# Patient Record
Sex: FEMALE | Race: WHITE | NOT HISPANIC OR LATINO | Employment: FULL TIME | URBAN - METROPOLITAN AREA
[De-identification: names, ages, dates, MRNs, and addresses within clinical notes are randomized per-mention and may not be internally consistent; named-entity substitution may affect disease eponyms.]

---

## 2023-03-22 DIAGNOSIS — I10 HYPERTENSION, UNSPECIFIED TYPE: Primary | ICD-10-CM

## 2023-03-27 RX ORDER — HYDROCHLOROTHIAZIDE 12.5 MG/1
TABLET ORAL
Qty: 180 TABLET | Refills: 1 | Status: SHIPPED | OUTPATIENT
Start: 2023-03-27 | End: 2023-09-08

## 2023-05-23 LAB — THYROTROPIN (MIU/L) IN SER/PLAS BY DETECTION LIMIT <= 0.05 MIU/L: 1.39 MIU/L (ref 0.44–3.98)

## 2023-07-26 DIAGNOSIS — E78.00 PURE HYPERCHOLESTEROLEMIA: Primary | ICD-10-CM

## 2023-07-27 ENCOUNTER — LAB (OUTPATIENT)
Dept: LAB | Facility: LAB | Age: 63
End: 2023-07-27
Payer: MEDICARE

## 2023-07-27 DIAGNOSIS — E78.00 PURE HYPERCHOLESTEROLEMIA: ICD-10-CM

## 2023-07-27 LAB
CHOLESTEROL (MG/DL) IN SER/PLAS: 218 MG/DL (ref 0–199)
CHOLESTEROL IN HDL (MG/DL) IN SER/PLAS: 54.7 MG/DL
CHOLESTEROL/HDL RATIO: 4
LDL: 147 MG/DL (ref 0–99)
TRIGLYCERIDE (MG/DL) IN SER/PLAS: 84 MG/DL (ref 0–149)
VLDL: 17 MG/DL (ref 0–40)

## 2023-07-27 PROCEDURE — 36415 COLL VENOUS BLD VENIPUNCTURE: CPT

## 2023-07-27 PROCEDURE — 80061 LIPID PANEL: CPT

## 2023-07-28 RX ORDER — LEVOTHYROXINE SODIUM 88 UG/1
1 TABLET ORAL DAILY
COMMUNITY
Start: 2015-10-31

## 2023-07-28 RX ORDER — ACETAMINOPHEN 500 MG
1 TABLET ORAL NIGHTLY
COMMUNITY
Start: 2021-05-24 | End: 2023-11-14 | Stop reason: ALTCHOICE

## 2023-09-06 DIAGNOSIS — I10 HYPERTENSION, UNSPECIFIED TYPE: ICD-10-CM

## 2023-09-08 RX ORDER — HYDROCHLOROTHIAZIDE 12.5 MG/1
TABLET ORAL
Qty: 180 TABLET | Refills: 1 | Status: SHIPPED | OUTPATIENT
Start: 2023-09-08 | End: 2023-12-28

## 2023-10-23 ENCOUNTER — APPOINTMENT (OUTPATIENT)
Dept: OPHTHALMOLOGY | Facility: CLINIC | Age: 63
End: 2023-10-23
Payer: MEDICARE

## 2023-10-23 ENCOUNTER — OFFICE VISIT (OUTPATIENT)
Dept: OPHTHALMOLOGY | Facility: CLINIC | Age: 63
End: 2023-10-23
Payer: MEDICARE

## 2023-10-23 DIAGNOSIS — H10.32 ACUTE CONJUNCTIVITIS OF LEFT EYE, UNSPECIFIED ACUTE CONJUNCTIVITIS TYPE: ICD-10-CM

## 2023-10-23 DIAGNOSIS — T15.12XA FOREIGN BODY OF CONJUNCTIVAL SAC OF LEFT EYE, INITIAL ENCOUNTER: Primary | ICD-10-CM

## 2023-10-23 PROCEDURE — 99213 OFFICE O/P EST LOW 20 MIN: CPT | Performed by: OPTOMETRIST

## 2023-10-23 PROCEDURE — 65210 REMOVE FOREIGN BODY FROM EYE: CPT | Mod: LEFT SIDE | Performed by: OPTOMETRIST

## 2023-10-23 RX ORDER — NEOMYCIN SULFATE, POLYMYXIN B SULFATE, AND DEXAMETHASONE 3.5; 10000; 1 MG/G; [USP'U]/G; MG/G
OINTMENT OPHTHALMIC EVERY 8 HOURS SCHEDULED
Qty: 3.5 G | Refills: 0 | Status: SHIPPED | OUTPATIENT
Start: 2023-10-23 | End: 2023-10-26

## 2023-10-23 ASSESSMENT — VISUAL ACUITY
OS_SC+: +2
OS_PH_SC: 20/30
OS_PH_SC+: -1
OD_SC+: -1
OD_PH_SC+: -2
OD_SC: 20/30
METHOD: SNELLEN - LINEAR
OS_SC: 20/50
OD_PH_SC: 20/20

## 2023-10-23 ASSESSMENT — CONF VISUAL FIELD
OS_INFERIOR_NASAL_RESTRICTION: 0
OD_NORMAL: 1
OS_SUPERIOR_NASAL_RESTRICTION: 0
OD_INFERIOR_NASAL_RESTRICTION: 0
OD_SUPERIOR_TEMPORAL_RESTRICTION: 0
OD_INFERIOR_TEMPORAL_RESTRICTION: 0
OS_INFERIOR_TEMPORAL_RESTRICTION: 0
OD_SUPERIOR_NASAL_RESTRICTION: 0
OS_SUPERIOR_TEMPORAL_RESTRICTION: 0
OS_NORMAL: 1

## 2023-10-23 ASSESSMENT — ENCOUNTER SYMPTOMS
RESPIRATORY NEGATIVE: 0
HEMATOLOGIC/LYMPHATIC NEGATIVE: 0
CARDIOVASCULAR NEGATIVE: 0
ENDOCRINE NEGATIVE: 0
CONSTITUTIONAL NEGATIVE: 0
ALLERGIC/IMMUNOLOGIC NEGATIVE: 0
MUSCULOSKELETAL NEGATIVE: 0
GASTROINTESTINAL NEGATIVE: 0
NEUROLOGICAL NEGATIVE: 0
PSYCHIATRIC NEGATIVE: 0
EYES NEGATIVE: 1

## 2023-10-23 ASSESSMENT — EXTERNAL EXAM - LEFT EYE: OS_EXAM: NORMAL

## 2023-10-23 ASSESSMENT — SLIT LAMP EXAM - LIDS
COMMENTS: NORMAL
COMMENTS: NORMAL

## 2023-10-23 ASSESSMENT — TONOMETRY
IOP_METHOD: TONOPEN (SQUEEZING)
OS_IOP_MMHG: 21
OD_IOP_MMHG: 18

## 2023-10-23 ASSESSMENT — EXTERNAL EXAM - RIGHT EYE: OD_EXAM: NORMAL

## 2023-10-23 NOTE — PROGRESS NOTES
Ms. Savage is a 64 yo female without significant ocular history presenting for left eye pain. Patient was outside when a liam of wind blew something into her eye.    Plaque like deposit on lateral aspect of left conj at 3 oc clock close to the limbus. This was removed with a cotton tip applicator after anesthetic was applied. Mild corneal staining as well at 3 oclock. Everted eyelid and no foreign body found anywhere.    Start neopolydex julia use TID x 3 days then can stop.     Pt is scheduled already to RTC 2 week for routine exam.

## 2023-11-07 PROBLEM — H52.12 MYOPIA OF LEFT EYE WITH ASTIGMATISM AND PRESBYOPIA: Status: ACTIVE | Noted: 2023-11-07

## 2023-11-07 PROBLEM — H52.203 ASTIGMATISM OF BOTH EYES: Status: ACTIVE | Noted: 2023-11-07

## 2023-11-07 PROBLEM — H52.201 HYPEROPIA OF RIGHT EYE WITH ASTIGMATISM AND PRESBYOPIA: Status: ACTIVE | Noted: 2023-11-07

## 2023-11-07 PROBLEM — H52.202 MYOPIA OF LEFT EYE WITH ASTIGMATISM AND PRESBYOPIA: Status: ACTIVE | Noted: 2023-11-07

## 2023-11-07 PROBLEM — R73.01 IMPAIRED FASTING GLUCOSE: Status: ACTIVE | Noted: 2023-11-07

## 2023-11-07 PROBLEM — H00.016 HORDEOLUM EXTERNUM OF LEFT EYE: Status: ACTIVE | Noted: 2023-11-07

## 2023-11-07 PROBLEM — H43.811 PVD (POSTERIOR VITREOUS DETACHMENT), RIGHT EYE: Status: ACTIVE | Noted: 2023-11-07

## 2023-11-07 PROBLEM — H52.01 HYPEROPIA OF RIGHT EYE WITH ASTIGMATISM AND PRESBYOPIA: Status: ACTIVE | Noted: 2023-11-07

## 2023-11-07 PROBLEM — E78.00 HYPERCHOLESTEROLEMIA: Status: ACTIVE | Noted: 2023-11-07

## 2023-11-07 PROBLEM — E03.8 OTHER SPECIFIED HYPOTHYROIDISM: Status: ACTIVE | Noted: 2023-11-07

## 2023-11-07 PROBLEM — E06.3 AUTOIMMUNE THYROIDITIS: Status: ACTIVE | Noted: 2023-11-07

## 2023-11-07 PROBLEM — M25.512 BILATERAL SHOULDER PAIN: Status: ACTIVE | Noted: 2023-11-07

## 2023-11-07 PROBLEM — I10 HYPERTENSION: Status: ACTIVE | Noted: 2023-11-07

## 2023-11-07 PROBLEM — N60.99 ATYPICAL HYPERPLASIA OF BREAST: Status: ACTIVE | Noted: 2023-11-07

## 2023-11-07 PROBLEM — H52.4 MYOPIA OF LEFT EYE WITH ASTIGMATISM AND PRESBYOPIA: Status: ACTIVE | Noted: 2023-11-07

## 2023-11-07 PROBLEM — M25.511 BILATERAL SHOULDER PAIN: Status: ACTIVE | Noted: 2023-11-07

## 2023-11-07 PROBLEM — E03.9 HYPOTHYROIDISM: Status: ACTIVE | Noted: 2023-11-07

## 2023-11-07 PROBLEM — L30.4 ECZEMA INTERTRIGO: Status: ACTIVE | Noted: 2023-11-07

## 2023-11-07 PROBLEM — E04.2 NONTOXIC MULTINODULAR GOITER: Status: ACTIVE | Noted: 2023-11-07

## 2023-11-07 PROBLEM — H52.4 HYPEROPIA OF RIGHT EYE WITH ASTIGMATISM AND PRESBYOPIA: Status: ACTIVE | Noted: 2023-11-07

## 2023-11-07 PROBLEM — E55.9 VITAMIN D DEFICIENCY: Status: ACTIVE | Noted: 2023-11-07

## 2023-11-07 RX ORDER — BIOTIN 10 MG
TABLET ORAL
COMMUNITY
End: 2023-11-14 | Stop reason: ALTCHOICE

## 2023-11-07 RX ORDER — KETOCONAZOLE 20 MG/G
CREAM TOPICAL
COMMUNITY
Start: 2023-07-27 | End: 2023-11-14 | Stop reason: ALTCHOICE

## 2023-11-07 RX ORDER — ERGOCALCIFEROL 1.25 MG/1
CAPSULE ORAL
COMMUNITY
End: 2023-11-14 | Stop reason: ALTCHOICE

## 2023-11-07 RX ORDER — NAFTIFINE HYDROCHLORIDE 10 MG/G
GEL TOPICAL
COMMUNITY
Start: 2007-10-19 | End: 2023-11-14 | Stop reason: ALTCHOICE

## 2023-11-07 RX ORDER — CIDER VINEGAR 300 MG
TABLET ORAL
COMMUNITY
End: 2023-11-14 | Stop reason: ALTCHOICE

## 2023-11-07 RX ORDER — IBUPROFEN 200 MG
TABLET ORAL
COMMUNITY

## 2023-11-07 RX ORDER — DESONIDE 0.5 MG/ML
LOTION TOPICAL
COMMUNITY
Start: 2007-11-30 | End: 2023-11-14 | Stop reason: ALTCHOICE

## 2023-11-07 RX ORDER — CHOLECALCIFEROL (VITAMIN D3) 50 MCG
1 TABLET ORAL DAILY
COMMUNITY
Start: 2022-12-23 | End: 2023-11-14 | Stop reason: ALTCHOICE

## 2023-11-07 RX ORDER — DICLOFENAC SODIUM 10 MG/G
GEL TOPICAL
COMMUNITY
Start: 2015-08-31 | End: 2023-11-14 | Stop reason: ALTCHOICE

## 2023-11-07 RX ORDER — CLINDAMYCIN PHOSPHATE 10 UG/ML
LOTION TOPICAL
COMMUNITY
Start: 2007-11-30 | End: 2023-11-14 | Stop reason: ALTCHOICE

## 2023-11-08 ENCOUNTER — OFFICE VISIT (OUTPATIENT)
Dept: OPHTHALMOLOGY | Facility: CLINIC | Age: 63
End: 2023-11-08
Payer: MEDICARE

## 2023-11-08 DIAGNOSIS — H52.201 HYPEROPIA OF RIGHT EYE WITH ASTIGMATISM AND PRESBYOPIA: ICD-10-CM

## 2023-11-08 DIAGNOSIS — H52.4 HYPEROPIA OF RIGHT EYE WITH ASTIGMATISM AND PRESBYOPIA: ICD-10-CM

## 2023-11-08 DIAGNOSIS — H25.13 AGE-RELATED NUCLEAR CATARACT OF BOTH EYES: Primary | ICD-10-CM

## 2023-11-08 DIAGNOSIS — H52.01 HYPEROPIA OF RIGHT EYE WITH ASTIGMATISM AND PRESBYOPIA: ICD-10-CM

## 2023-11-08 PROCEDURE — 92015 DETERMINE REFRACTIVE STATE: CPT | Performed by: STUDENT IN AN ORGANIZED HEALTH CARE EDUCATION/TRAINING PROGRAM

## 2023-11-08 PROCEDURE — 92014 COMPRE OPH EXAM EST PT 1/>: CPT | Performed by: STUDENT IN AN ORGANIZED HEALTH CARE EDUCATION/TRAINING PROGRAM

## 2023-11-08 ASSESSMENT — TONOMETRY
IOP_METHOD: GOLDMANN APPLANATION
OS_IOP_MMHG: 16
OD_IOP_MMHG: 16

## 2023-11-08 ASSESSMENT — REFRACTION_MANIFEST
OD_CYLINDER: -2.25
OD_ADD: +2.50
OS_AXIS: 073
OS_SPHERE: +0.25
OS_ADD: +2.50
OS_CYLINDER: -2.75
OD_AXIS: 097
OD_SPHERE: +0.50

## 2023-11-08 ASSESSMENT — ENCOUNTER SYMPTOMS
CARDIOVASCULAR NEGATIVE: 0
NEUROLOGICAL NEGATIVE: 0
ALLERGIC/IMMUNOLOGIC NEGATIVE: 0
HEMATOLOGIC/LYMPHATIC NEGATIVE: 0
GASTROINTESTINAL NEGATIVE: 0
RESPIRATORY NEGATIVE: 0
ENDOCRINE NEGATIVE: 0
CONSTITUTIONAL NEGATIVE: 0
EYES NEGATIVE: 0
PSYCHIATRIC NEGATIVE: 0
MUSCULOSKELETAL NEGATIVE: 0

## 2023-11-08 ASSESSMENT — CONF VISUAL FIELD
OD_INFERIOR_TEMPORAL_RESTRICTION: 0
OS_INFERIOR_TEMPORAL_RESTRICTION: 0
OS_NORMAL: 1
OS_SUPERIOR_NASAL_RESTRICTION: 0
METHOD: COUNTING FINGERS
OD_INFERIOR_NASAL_RESTRICTION: 0
OD_SUPERIOR_TEMPORAL_RESTRICTION: 0
OS_INFERIOR_NASAL_RESTRICTION: 0
OD_NORMAL: 1
OD_SUPERIOR_NASAL_RESTRICTION: 0
OS_SUPERIOR_TEMPORAL_RESTRICTION: 0

## 2023-11-08 ASSESSMENT — REFRACTION_WEARINGRX
OS_SPHERE: +0.25
OS_CYLINDER: -2.50
OD_ADD: +2.50
OS_ADD: +2.50
OS_AXIS: 070
OD_CYLINDER: -2.25
OD_AXIS: 095
OD_SPHERE: +0.25

## 2023-11-08 ASSESSMENT — SLIT LAMP EXAM - LIDS
COMMENTS: NORMAL
COMMENTS: NORMAL

## 2023-11-08 ASSESSMENT — EXTERNAL EXAM - RIGHT EYE: OD_EXAM: NORMAL

## 2023-11-08 ASSESSMENT — VISUAL ACUITY
CORRECTION_TYPE: GLASSES
OD_CC: 20/25
OS_CC+: -2
OS_CC: 20/30
METHOD: SNELLEN - LINEAR

## 2023-11-08 ASSESSMENT — CUP TO DISC RATIO
OD_RATIO: .30
OS_RATIO: .30

## 2023-11-08 ASSESSMENT — EXTERNAL EXAM - LEFT EYE: OS_EXAM: NORMAL

## 2023-11-08 NOTE — PROGRESS NOTES
Assessment/Plan   Diagnoses and all orders for this visit:  Age-related nuclear cataract of both eyes  -mild non visually significant. Pt ed.   Hyperopia of right eye with astigmatism and presbyopia  -New spec rx released today per patient request. Ocular health wnl for age OU. Monitor 1 year or sooner prn. Refraction billed today. Small changes to RX with good vision 20/20 right eye (OD)+left eye (OS).     RTC 1 year for annual with JAMAL and ALLAN

## 2023-11-12 NOTE — PROGRESS NOTES
Subjective   Reason for Visit: Jailyn Savage is an 63 y.o. female here for a Medicare Wellness visit.               HPI  The patient reports a history of intermittent episodes of a throbbing discomfort in the popliteal fossae over the past few months.  The patient reports that when the episodes occur they occur most often at the end of the day.  She reports no other associated symptoms.    The patient reports a history of intermittent momentary episodes of discomfort in the right upper chest region times a few months.  She reports that the episodes do not occur with exercise but occur only at rest.  She reports that the episodes may occur after oral intake.  She reports no radiation of discomfort and no other associated symptoms.    The patient reports a history of intermittent episodes of a pressure aching pain in the right wrist recently.  She reports that the episodes can be precipitated by yoga and by lifting weights.  She reports no radiation of discomfort.  She reports no other associated symptoms.    The patient reports a history of intermittent episodes of tightness in both hips recently.  She reports that the episodes can be precipitated by inactivity.  No other associated symptoms.    Over the past year, the patient reports no episodes of discomfort in the left upper chest region.    Over the past year, the patient reports no morning episodes of nausea.    Over the past year, the patient reports that she has had no difficulty falling back asleep.    Since beginning use of vitamin B complex approximately 1 year ago.  The patient reports a decrease in the frequency and intensity of the chronic intermittent episodes of cramping pain in either thigh or either calf region.    Over the past 6 months, the patient reports that she has experienced no episodes of numbness over the proximal and lateral surfaces of the right thigh.  No other new complaints.  Patient Care Team:  Naveen Roth MD as PCP - General  (Internal Medicine)  Naveen Roth MD     Review of Systems  All systems have been reviewed and are normal except as previously noted  Objective   Vitals:  There were no vitals taken for this visit.      Physical Exam  Head-palpation revealed no tenderness over the maxillary or frontal sinuses  Eyes-extraocular movements intact; pupils equal and reactive to light ;fundi not well-visualized secondary to the presence of cataracts  Ears-palpation ofpinnas and tragusesrevealed no tenderness. External auditory canals not erythematous or swollen. TMs clear.      Nose-turbinates not erythematous or swollen ;no septal deviation noted  Mouth-posterior pharynx is not erythematous or swollen. Tonsillar pillars appeared normal no exudates  Neck no lymphadenopathy. Thyroid gland not enlarged. , No bruits  Breasts-no asymmetry or nipple discharge noted.  Palpation revealed no tenderness or masses, no axillary lymphadenopathy noted.  Lungs-clear to auscultation bilaterally  Cardiac-rate normal rhythm regular no murmurs no JVD  Abdomen-soft nondistended normal active bowel sounds. Palpation revealed no tenderness or masses  Pulses 2+ bilaterally  Extremities-no peripheral edema  Musculoskeletal  Chest-no erythema or swelling, Full range of motion in all directions of motion with no pain. Palpation revealed no tenderness or increase in warmth  Right wrist-no erythema or swelling.  Full range of motion with pain noted on extension.  Full range of motion with no pain noted in all other directions of motion.  Palpation revealed no tenderness or increase in warmth  Hips-no erythema or swelling.  Windshield wiper test negative.  Full range of motion in all directions of motion with no pain.  Palpation revealed no tenderness or increase in warmth  Knees-no erythema or swelling.  Full range of motion in all directions of motion with no pain.  Palpation revealed crepitus but no tenderness or increase in warmth.  Negative Lachemann's test,  negative Saira's test, negative patellar apprehension test.  No pain or laxity of the collateral ligaments noted.    Neurologic  Mental status-alert and oriented x3   Cranial nerves-2 through 12 grossly intact, no visual field abnormalities  Motor-no pronator drift noted, strength-5/5 in all muscle groups tested, , no tremor noted.  No bradykinesia noted.  No rigidity noted.  Negative pull test  Sensory-Light touch sensation fully intact  Pinprick sensation fully intact  Vibratory sensation fully intact  Cerebellar-no truncal ataxia, good coordination finger-nose testing,, good coordination heel-to-shin testing, normal rapid alternating movements  Romberg negative, no abnormality in tandem gait  Reflexes-2+/4 bilaterally  Assessment/Plan   Problem List Items Addressed This Visit    None         Assessment  Intermittent momentary episodes of discomfort in the right upper chest region-May be secondary to neuromuscular chest discomfort, gastroesophageal reflux/spasm-less likely  Hypertension-blood pressure at goal  COVID-19 May 2022  Status post bilateral mammoplasty November 27, 2017  Colonic polyps.  Diverticulosis.  Dysfunctional uterine bleeding.  Intermittent episodes of a pressure aching pain right wrist-May be secondary to tendinitis, osteoarthritis-less likely.  Intermittent episodes of tightness in both hip regions-May be secondary to muscle strain, bilateral hip greater trochanteric bursitis intermittent episodes of throbbing pain in the popliteal fossae-May be secondary to osteoarthritis  Chondromalacia patella right knee.  Vitamin D deficiency.  Impaired fasting glucose  Hypercholesterolemia.  Hypothyroidism.  Chronic intermittent episodes of cramping pain in either thigh or either calf-likely secondary to idiopathic nocturnal leg cramps.  Foreign body left eye-status post removal October 23, 2023  Bilateral cataracts  Chronic occasional episodes of numbness located proximal and lateral surface of the  right thigh-unsure of etiology. May be secondary to right femoral cutaneous neuropathy  Plan  Obtain EKG today  Obtain CBC differential, CMP, fasting lipid profile, TSH, vitamin D level, vitamin B12 level, hemoglobin A1c, urinalysis today.  Begin Advil 400-600 mg p.o. every 6 hours as needed pain.  Continue current dosages of hydrochlorothiazide and levothyroxine for the time being  Patient should return for annual Medicare wellness visit in 1 year

## 2023-11-14 ENCOUNTER — OFFICE VISIT (OUTPATIENT)
Dept: PRIMARY CARE | Facility: CLINIC | Age: 63
End: 2023-11-14
Payer: MEDICARE

## 2023-11-14 VITALS
HEART RATE: 62 BPM | BODY MASS INDEX: 29.41 KG/M2 | WEIGHT: 166 LBS | DIASTOLIC BLOOD PRESSURE: 74 MMHG | SYSTOLIC BLOOD PRESSURE: 100 MMHG | HEIGHT: 63 IN

## 2023-11-14 DIAGNOSIS — Z00.00 ROUTINE GENERAL MEDICAL EXAMINATION AT A HEALTH CARE FACILITY: ICD-10-CM

## 2023-11-14 DIAGNOSIS — I10 PRIMARY HYPERTENSION: Primary | ICD-10-CM

## 2023-11-14 DIAGNOSIS — R73.03 PREDIABETES: ICD-10-CM

## 2023-11-14 DIAGNOSIS — E78.00 HYPERCHOLESTEROLEMIA: ICD-10-CM

## 2023-11-14 LAB
ALBUMIN SERPL BCP-MCNC: 4.4 G/DL (ref 3.4–5)
ALP SERPL-CCNC: 59 U/L (ref 33–136)
ALT SERPL W P-5'-P-CCNC: 14 U/L (ref 7–45)
ANION GAP SERPL CALC-SCNC: 13 MMOL/L (ref 10–20)
AST SERPL W P-5'-P-CCNC: 20 U/L (ref 9–39)
BASOPHILS # BLD AUTO: 0.05 X10*3/UL (ref 0–0.1)
BASOPHILS NFR BLD AUTO: 1.2 %
BILIRUB SERPL-MCNC: 0.4 MG/DL (ref 0–1.2)
BUN SERPL-MCNC: 28 MG/DL (ref 6–23)
CALCIUM SERPL-MCNC: 9.5 MG/DL (ref 8.6–10.6)
CHLORIDE SERPL-SCNC: 101 MMOL/L (ref 98–107)
CHOLEST SERPL-MCNC: 221 MG/DL (ref 0–199)
CHOLESTEROL/HDL RATIO: 4.4
CO2 SERPL-SCNC: 31 MMOL/L (ref 21–32)
CREAT SERPL-MCNC: 0.76 MG/DL (ref 0.5–1.05)
CRP SERPL HS-MCNC: 0.7 MG/L
EOSINOPHIL # BLD AUTO: 0.03 X10*3/UL (ref 0–0.7)
EOSINOPHIL NFR BLD AUTO: 0.7 %
ERYTHROCYTE [DISTWIDTH] IN BLOOD BY AUTOMATED COUNT: 12.1 % (ref 11.5–14.5)
EST. AVERAGE GLUCOSE BLD GHB EST-MCNC: 120 MG/DL
GFR SERPL CREATININE-BSD FRML MDRD: 87 ML/MIN/1.73M*2
GLUCOSE SERPL-MCNC: 86 MG/DL (ref 74–99)
HBA1C MFR BLD: 5.8 %
HCT VFR BLD AUTO: 39.8 % (ref 36–46)
HCV AB SER QL: NONREACTIVE
HDLC SERPL-MCNC: 50.3 MG/DL
HGB BLD-MCNC: 12.9 G/DL (ref 12–16)
IMM GRANULOCYTES # BLD AUTO: 0 X10*3/UL (ref 0–0.7)
IMM GRANULOCYTES NFR BLD AUTO: 0 % (ref 0–0.9)
LDLC SERPL CALC-MCNC: 154 MG/DL
LYMPHOCYTES # BLD AUTO: 1.63 X10*3/UL (ref 1.2–4.8)
LYMPHOCYTES NFR BLD AUTO: 38.2 %
MCH RBC QN AUTO: 30.3 PG (ref 26–34)
MCHC RBC AUTO-ENTMCNC: 32.4 G/DL (ref 32–36)
MCV RBC AUTO: 93 FL (ref 80–100)
MONOCYTES # BLD AUTO: 0.4 X10*3/UL (ref 0.1–1)
MONOCYTES NFR BLD AUTO: 9.4 %
NEUTROPHILS # BLD AUTO: 2.16 X10*3/UL (ref 1.2–7.7)
NEUTROPHILS NFR BLD AUTO: 50.5 %
NON HDL CHOLESTEROL: 171 MG/DL (ref 0–149)
NRBC BLD-RTO: 0 /100 WBCS (ref 0–0)
PLATELET # BLD AUTO: 259 X10*3/UL (ref 150–450)
POTASSIUM SERPL-SCNC: 4 MMOL/L (ref 3.5–5.3)
PROT SERPL-MCNC: 7.1 G/DL (ref 6.4–8.2)
RBC # BLD AUTO: 4.26 X10*6/UL (ref 4–5.2)
SODIUM SERPL-SCNC: 141 MMOL/L (ref 136–145)
TRIGL SERPL-MCNC: 86 MG/DL (ref 0–149)
TSH SERPL-ACNC: 0.89 MIU/L (ref 0.44–3.98)
VLDL: 17 MG/DL (ref 0–40)
WBC # BLD AUTO: 4.3 X10*3/UL (ref 4.4–11.3)

## 2023-11-14 PROCEDURE — 36415 COLL VENOUS BLD VENIPUNCTURE: CPT

## 2023-11-14 PROCEDURE — 1160F RVW MEDS BY RX/DR IN RCRD: CPT | Performed by: INTERNAL MEDICINE

## 2023-11-14 PROCEDURE — 80053 COMPREHEN METABOLIC PANEL: CPT

## 2023-11-14 PROCEDURE — 84443 ASSAY THYROID STIM HORMONE: CPT

## 2023-11-14 PROCEDURE — 1159F MED LIST DOCD IN RCRD: CPT | Performed by: INTERNAL MEDICINE

## 2023-11-14 PROCEDURE — 87086 URINE CULTURE/COLONY COUNT: CPT

## 2023-11-14 PROCEDURE — 85025 COMPLETE CBC W/AUTO DIFF WBC: CPT

## 2023-11-14 PROCEDURE — 3078F DIAST BP <80 MM HG: CPT | Performed by: INTERNAL MEDICINE

## 2023-11-14 PROCEDURE — G0439 PPPS, SUBSEQ VISIT: HCPCS | Performed by: INTERNAL MEDICINE

## 2023-11-14 PROCEDURE — 93000 ELECTROCARDIOGRAM COMPLETE: CPT | Performed by: INTERNAL MEDICINE

## 2023-11-14 PROCEDURE — 99213 OFFICE O/P EST LOW 20 MIN: CPT | Performed by: INTERNAL MEDICINE

## 2023-11-14 PROCEDURE — 86803 HEPATITIS C AB TEST: CPT

## 2023-11-14 PROCEDURE — 86141 C-REACTIVE PROTEIN HS: CPT

## 2023-11-14 PROCEDURE — 1170F FXNL STATUS ASSESSED: CPT | Performed by: INTERNAL MEDICINE

## 2023-11-14 PROCEDURE — 83036 HEMOGLOBIN GLYCOSYLATED A1C: CPT

## 2023-11-14 PROCEDURE — 3074F SYST BP LT 130 MM HG: CPT | Performed by: INTERNAL MEDICINE

## 2023-11-14 PROCEDURE — 80061 LIPID PANEL: CPT

## 2023-11-14 RX ORDER — MULTIVITAMIN/IRON/FOLIC ACID 18MG-0.4MG
1 TABLET ORAL DAILY
COMMUNITY

## 2023-11-14 ASSESSMENT — ACTIVITIES OF DAILY LIVING (ADL)
MANAGING_FINANCES: INDEPENDENT
BATHING: INDEPENDENT
TAKING_MEDICATION: INDEPENDENT
GROCERY_SHOPPING: INDEPENDENT
DRESSING: INDEPENDENT
DOING_HOUSEWORK: INDEPENDENT

## 2023-11-14 ASSESSMENT — ENCOUNTER SYMPTOMS
DEPRESSION: 0
OCCASIONAL FEELINGS OF UNSTEADINESS: 0
LOSS OF SENSATION IN FEET: 0

## 2023-11-16 LAB — BACTERIA UR CULT: NO GROWTH

## 2023-12-28 DIAGNOSIS — I10 HYPERTENSION, UNSPECIFIED TYPE: ICD-10-CM

## 2023-12-28 RX ORDER — HYDROCHLOROTHIAZIDE 12.5 MG/1
TABLET ORAL
Qty: 180 TABLET | Refills: 1 | Status: SHIPPED | OUTPATIENT
Start: 2023-12-28

## 2024-06-25 ENCOUNTER — APPOINTMENT (OUTPATIENT)
Dept: ENDOCRINOLOGY | Facility: CLINIC | Age: 64
End: 2024-06-25
Payer: MEDICARE

## 2024-06-25 VITALS
BODY MASS INDEX: 29.62 KG/M2 | HEART RATE: 76 BPM | HEIGHT: 63 IN | RESPIRATION RATE: 16 BRPM | SYSTOLIC BLOOD PRESSURE: 110 MMHG | DIASTOLIC BLOOD PRESSURE: 64 MMHG | WEIGHT: 167.2 LBS

## 2024-06-25 DIAGNOSIS — R73.03 PRE-DIABETES: Primary | ICD-10-CM

## 2024-06-25 DIAGNOSIS — E03.9 HYPOTHYROIDISM, UNSPECIFIED TYPE: ICD-10-CM

## 2024-06-25 PROCEDURE — 1159F MED LIST DOCD IN RCRD: CPT | Performed by: INTERNAL MEDICINE

## 2024-06-25 PROCEDURE — 1160F RVW MEDS BY RX/DR IN RCRD: CPT | Performed by: INTERNAL MEDICINE

## 2024-06-25 PROCEDURE — 1036F TOBACCO NON-USER: CPT | Performed by: INTERNAL MEDICINE

## 2024-06-25 PROCEDURE — 99213 OFFICE O/P EST LOW 20 MIN: CPT | Performed by: INTERNAL MEDICINE

## 2024-06-25 PROCEDURE — 3074F SYST BP LT 130 MM HG: CPT | Performed by: INTERNAL MEDICINE

## 2024-06-25 PROCEDURE — 3078F DIAST BP <80 MM HG: CPT | Performed by: INTERNAL MEDICINE

## 2024-06-25 ASSESSMENT — ENCOUNTER SYMPTOMS
NAUSEA: 0
FATIGUE: 0
HEADACHES: 0
DIARRHEA: 0
VOMITING: 0
CHILLS: 0
COUGH: 0
FEVER: 0
SHORTNESS OF BREATH: 0
PALPITATIONS: 0

## 2024-06-25 NOTE — PROGRESS NOTES
Endocrinology: Follow up visit  Subjective   Patient ID: Jailyn Savage is a 64 y.o. female who presents for Hypothyroidism.    PCP: Naveen Roth MD    HPI  Since last visit doing well.  Still travelling a great deal.  A1c still in predm range: exercises often but knows could do better with eating.   Taking t4 as directed    Review of Systems   Constitutional:  Negative for chills, fatigue and fever.   Respiratory:  Negative for cough and shortness of breath.    Cardiovascular:  Negative for chest pain and palpitations.   Gastrointestinal:  Negative for diarrhea, nausea and vomiting.   Neurological:  Negative for headaches.       Patient Active Problem List   Diagnosis    Astigmatism of both eyes    Atypical hyperplasia of breast    Autoimmune thyroiditis    Bilateral shoulder pain    Chondromalacia of patella    Eczema intertrigo    Enthesopathy    Hyperopia of right eye with astigmatism and presbyopia    Hypertension    Hypothyroidism    Other specified hypothyroidism    Impaired fasting glucose    Hypercholesterolemia    Nontoxic multinodular goiter    PVD (posterior vitreous detachment), right eye    Vitamin D deficiency    Age-related nuclear cataract of both eyes        Home Meds:  Current Outpatient Medications   Medication Instructions    b complex 0.4 mg tablet 1 tablet, oral, Daily    hydroCHLOROthiazide (HYDRODiuril) 12.5 mg tablet TAKE 2 TABLETS BY MOUTH EVERY DAY    ibuprofen 200 mg tablet     levothyroxine (Synthroid, Levoxyl) 88 mcg tablet 1 tablet, oral, Daily    multivitamin capsule 1 capsule, oral, Daily RT        No Known Allergies     Objective   Vitals:    06/25/24 1326   BP: 110/64   Pulse: 76   Resp: 16      Vitals:    06/25/24 1326   Weight: 75.8 kg (167 lb 3.2 oz)      Body mass index is 29.62 kg/m².   Physical Exam  Constitutional:       Appearance: Normal appearance. She is overweight.   HENT:      Head: Normocephalic and atraumatic.   Neck:      Thyroid: No thyroid mass, thyromegaly or  "thyroid tenderness.   Cardiovascular:      Rate and Rhythm: Normal rate and regular rhythm.      Heart sounds: No murmur heard.     No gallop.   Pulmonary:      Effort: Pulmonary effort is normal.      Breath sounds: Normal breath sounds.   Abdominal:      Palpations: Abdomen is soft.      Comments: benign   Neurological:      General: No focal deficit present.      Mental Status: She is alert and oriented to person, place, and time.      Deep Tendon Reflexes: Reflexes are normal and symmetric.   Psychiatric:         Behavior: Behavior is cooperative.         Labs:  Lab Results   Component Value Date    HGBA1C 5.8 (H) 11/14/2023    TSH 0.89 11/14/2023    FREET4 1.60 (H) 05/24/2021      No results found for: \"PR1\", \"THYROIDPAB\", \"TSI\"     Assessment/Plan   Problem List Items Addressed This Visit       Hypothyroidism    Relevant Orders    TSH with reflex to Free T4 if abnormal     Other Visit Diagnoses       Pre-diabetes    -  Primary    Relevant Orders    Hemoglobin A1C          Blood work when able  Work on lowering carbs  Follow up in one year    Electronically signed by:  Latricia Escobar MD 06/25/24 1:54 PM              "

## 2024-08-14 ENCOUNTER — LAB (OUTPATIENT)
Dept: LAB | Facility: LAB | Age: 64
End: 2024-08-14
Payer: MEDICARE

## 2024-08-14 DIAGNOSIS — E03.9 HYPOTHYROIDISM, UNSPECIFIED TYPE: ICD-10-CM

## 2024-08-14 DIAGNOSIS — E03.9 HYPOTHYROIDISM, UNSPECIFIED: ICD-10-CM

## 2024-08-14 DIAGNOSIS — R73.03 PRE-DIABETES: ICD-10-CM

## 2024-08-14 LAB
EST. AVERAGE GLUCOSE BLD GHB EST-MCNC: 120 MG/DL
HBA1C MFR BLD: 5.8 %
TSH SERPL-ACNC: 1.28 MIU/L (ref 0.44–3.98)

## 2024-08-14 RX ORDER — LEVOTHYROXINE SODIUM 88 UG/1
88 TABLET ORAL DAILY
Qty: 90 TABLET | Refills: 1 | Status: SHIPPED | OUTPATIENT
Start: 2024-08-14

## 2024-08-16 DIAGNOSIS — I10 HYPERTENSION, UNSPECIFIED TYPE: ICD-10-CM

## 2024-08-16 RX ORDER — HYDROCHLOROTHIAZIDE 12.5 MG/1
TABLET ORAL
Qty: 180 TABLET | Refills: 1 | Status: SHIPPED | OUTPATIENT
Start: 2024-08-16

## 2025-02-11 DIAGNOSIS — E03.9 HYPOTHYROIDISM, UNSPECIFIED: ICD-10-CM

## 2025-02-11 RX ORDER — LEVOTHYROXINE SODIUM 88 UG/1
88 TABLET ORAL DAILY
Qty: 90 TABLET | Refills: 1 | Status: SHIPPED | OUTPATIENT
Start: 2025-02-11

## 2025-02-12 DIAGNOSIS — I10 HYPERTENSION, UNSPECIFIED TYPE: ICD-10-CM

## 2025-02-12 RX ORDER — HYDROCHLOROTHIAZIDE 12.5 MG/1
TABLET ORAL
Qty: 180 TABLET | Refills: 1 | Status: SHIPPED | OUTPATIENT
Start: 2025-02-12

## 2025-05-08 ENCOUNTER — OFFICE VISIT (OUTPATIENT)
Dept: URGENT CARE | Age: 65
End: 2025-05-08
Payer: MEDICARE

## 2025-05-08 VITALS
RESPIRATION RATE: 16 BRPM | HEART RATE: 59 BPM | TEMPERATURE: 98.7 F | DIASTOLIC BLOOD PRESSURE: 77 MMHG | OXYGEN SATURATION: 99 % | SYSTOLIC BLOOD PRESSURE: 122 MMHG

## 2025-05-08 DIAGNOSIS — R05.1 ACUTE COUGH: Primary | ICD-10-CM

## 2025-05-08 RX ORDER — CETIRIZINE HYDROCHLORIDE 10 MG/1
10 TABLET ORAL DAILY
Qty: 30 TABLET | Refills: 0 | Status: SHIPPED | OUTPATIENT
Start: 2025-05-08 | End: 2025-06-07

## 2025-05-08 RX ORDER — ALBUTEROL SULFATE 90 UG/1
2 INHALANT RESPIRATORY (INHALATION) EVERY 6 HOURS PRN
Qty: 18 G | Refills: 0 | Status: SHIPPED | OUTPATIENT
Start: 2025-05-08 | End: 2026-05-08

## 2025-05-08 ASSESSMENT — ENCOUNTER SYMPTOMS: COUGH: 1

## 2025-05-08 NOTE — PROGRESS NOTES
Subjective   Patient ID: Jailyn Savage is a 65 y.o. female. They present today with a chief complaint of Cough (PT presents with a cough she has had for over a month mostly a sinus cough. ).    History of Present Illness    Cough        65-year-old patient presents for concerns of a cough. She endorses that she was treated for a URI with a z-pack, medrol dose pack, and benzonatate. She did complete her antibiotic and medrol dose pack, but she is still experiencing a dry cough. She states that it is not productive, and it is worse when she lays down at night. It is improved with cough drops and the steam from hot showers. She denies chest pain, shortness of breath, or chest congestion. She is here for evaluation.     Past Medical History  Allergies as of 05/08/2025    (No Known Allergies)       Prescriptions Prior to Admission[1]     Medical History[2]    Surgical History[3]     reports that she has never smoked. She has never used smokeless tobacco. She reports current alcohol use.    Review of Systems  Review of Systems   Respiratory:  Positive for cough.                                   Objective    Vitals:    05/08/25 1547   BP: 122/77   BP Location: Left arm   Patient Position: Sitting   Pulse: 59   Resp: 16   Temp: 37.1 °C (98.7 °F)   SpO2: 99%     No LMP recorded (lmp unknown). Patient is postmenopausal.    Physical Exam  HENT:      Right Ear: Tympanic membrane normal.      Left Ear: Tympanic membrane normal.   Cardiovascular:      Rate and Rhythm: Normal rate and regular rhythm.      Pulses: Normal pulses.      Heart sounds: Normal heart sounds.   Pulmonary:      Effort: Pulmonary effort is normal.      Breath sounds: Normal breath sounds.         Procedures    Point of Care Test & Imaging Results from this visit  No results found for this visit on 05/08/25.   Imaging  No results found.    Cardiology, Vascular, and Other Imaging  No other imaging results found for the past 2 days      Diagnostic study  results (if any) were reviewed by LIU Pavon.    Assessment/Plan   Allergies, medications, history, and pertinent labs/EKGs/Imaging reviewed by LIU Pavon.     Medical Decision Making  Based on patient's presenting symptoms and physical examination, I suspect she has a lingering cough from her prior infection. She is agreeable to an albuterol inhaler, and we also discussed taking an antihistamine daily; we also discussed taking a teaspoonful of honey to help as a cough suppressant, and to use a humidifier at night. As a result of the work-up, the patient was discharged home.  she was informed of her diagnosis and instructed to come back with any concerns or worsening of condition.  she and was agreeable to the plan as discussed above.  she was given the opportunity to ask questions.  All of the patient's questions were answered.    Orders and Diagnoses  Diagnoses and all orders for this visit:  Acute cough  -     albuterol 90 mcg/actuation inhaler; Inhale 2 puffs every 6 hours if needed for wheezing.  -     cetirizine (ZyrTEC) 10 mg tablet; Take 1 tablet (10 mg) by mouth once daily.      Medical Admin Record      Patient disposition: Home    Electronically signed by LIU Pavon  4:39 PM           [1] (Not in a hospital admission)   [2] History reviewed. No pertinent past medical history.  [3]   Past Surgical History:  Procedure Laterality Date    OTHER SURGICAL HISTORY  05/24/2021    Breast reduction

## 2025-05-25 NOTE — ASSESSMENT & PLAN NOTE
Orders:    C-Reactive Protein, High Sensitivity; Future    CBC and Auto Differential; Future    Comprehensive Metabolic Panel; Future    Lipid Panel; Future    ECG 12 Lead

## 2025-05-25 NOTE — ASSESSMENT & PLAN NOTE
Orders:    C-Reactive Protein, High Sensitivity; Future    CBC and Auto Differential; Future    Comprehensive Metabolic Panel; Future    ECG 12 Lead

## 2025-05-25 NOTE — PROGRESS NOTES
Subjective   Reason for Visit: Jailyn Savage is an 65 y.o. female here for a Medicare Wellness visit.               HPI  Over the past 3 weeks, the patient reports a continued cough.  She reports that the cough over the past 3 weeks has been predominantly nonproductive but when productive productive of scant yellow sputum.  Over the past few weeks, she reports no postnasal drip.  She does report however history of frequent throat clearing beginning last month.  She reports no other associated symptoms.  She has not regularly been using Zyrtec that had been prescribed at the [facility] care center in early May.    Over the past 1.5 years, the patient reports no episodes of pain in the right upper chest region.    Over the past 1.5 years, the patient reports continued chronic intermittent episodes of a pressure aching pain in the right wrist.  She again reports that the episodes can be precipitated by placing pressure on the right wrist, by performing yoga, lifting weights.  She reports no associated symptoms.  Over the past 1.5 years, she reports a decrease in the frequency and intensity of the episodes.    Over the past 1.5 years, the patient reports continued chronic intermittent episodes of tightness in both hip regions.  She reports that more recently the episodes have been precipitated by lifting heavier objects.  She reports no associated symptoms.  Over the past 1.5 years, she reports a decrease in the frequency and intensity of the episodes    Over the past 1.5 years, she reports no episodes of throbbing pain in the popliteal fossae.    Over the past 1.5 years, she reports no episodes of numbness over the proximal and lateral surfaces of the right thigh.  No other new complaints.  Note that the patient last took hydrochlorothiazide yesterday morning  Patient Care Team:  Naveen Roth MD as PCP - General (Internal Medicine)  Latricia Escobar MD as PCP [facility]ributed [facility]  Naveen Roth MD     Review  of Systems  All systems have been reviewed and are normal except as previously noted  Objective   Vitals:  LMP  (LMP Unknown)       Physical Exam  Head-palpation revealed no tenderness over the maxillary or frontal sinuses  Ears-palpation of each pinna and each tragus revealed no tenderness.  External auditory canals not erythematous or swollen, TMs slightly bulging but clear  Nose-turbinates not erythematous or swollen ; nasal septal deviation noted to the left  Mouth-posterior pharynx is not erythematous or swollen. Tonsillar pillars appeared normal no exudates  Neck no lymphadenopathy. Thyroid gland not enlarged. , No bruits  Lungs-clear to auscultation bilaterally  Cardiac-bradycardic, rhythm regular no murmurs no JVD  Abdomen-soft nondistended normal active bowel sounds. Palpation revealed no tenderness or masses  Pulses 2+ bilaterally  Extremities-no peripheral edema    Assessment & Plan  Routine general medical examination at a health care facility    Orders:    C-Reactive Protein, High Sensitivity; Future    CBC and Auto Differential; Future    Comprehensive Metabolic Panel; Future    Vitamin B12; Future    Vitamin D 25-Hydroxy,Total (for eval of Vitamin D levels); Future    ECG 12 Lead    Prediabetes    Orders:    C-Reactive Protein, High Sensitivity; Future    CBC and Auto Differential; Future    Comprehensive Metabolic Panel; Future    Hemoglobin A1C; Future    Lipid Panel; Future    ECG 12 Lead    Primary hypertension    Orders:    C-Reactive Protein, High Sensitivity; Future    CBC and Auto Differential; Future    Comprehensive Metabolic Panel; Future    ECG 12 Lead    Hypercholesterolemia    Orders:    C-Reactive Protein, High Sensitivity; Future    CBC and Auto Differential; Future    Comprehensive Metabolic Panel; Future    Lipid Panel; Future    ECG 12 Lead    Hypothyroidism, unspecified type    Orders:    C-Reactive Protein, High Sensitivity; Future    CBC and Auto Differential; Future     Comprehensive Metabolic Panel; Future    Lipid Panel; Future    Thyroid Stimulating Hormone; Future    Vitamin D deficiency    Orders:    Vitamin D 25-Hydroxy,Total (for eval of Vitamin D levels); Future                Assessment    Hypertension-blood pressure at goal  COVID-19 May 2022  Continued cough-May be secondary to a postinfectious cough mediated by upper airway cough syndrome, hyperactivity of airways-unlikely  Status post bilateral mammoplasty November 27, 2017  Colonic polyps.  Diverticulosis.  Dysfunctional uterine bleeding.  Chronic intermittent episodes of a pressure aching pain right wrist-May be secondary to tendinitis, osteoarthritis-less likely.  Chronic intermittent episodes of tightness in both hip regions-May be secondary to muscle strain, bilateral hip greater trochanteric bursitis   Chondromalacia patella right knee.  Vitamin D deficiency.  Prediabetes  Hypercholesterolemia.  Hypothyroidism.  Chronic intermittent episodes of cramping pain in either thigh or either calf-likely secondary to idiopathic nocturnal leg cramps.  Foreign body left eye-status post removal October 23, 2023  Bilateral cataracts    Plan  Obtain EKG today  Obtain CBC differential, CMP, fasting lipid profile, TSH, vitamin D level, vitamin B12 level, hemoglobin A1c, urinalysis today.  I have again urged the patient to receive 1 dose of Prevnar 20 as soon as possible.  I also have suggested that the patient schedule a surveillance bone density study.  I recommended that she restart Zyrtec 10 mg p.o. daily for the next 2 weeks and to contact me in 2 weeks with her condition regarding the cough.  She will continue all of her other current medications for now.  She will return for an annual Medicare wellness visit in 1 year

## 2025-05-25 NOTE — ASSESSMENT & PLAN NOTE
Orders:    C-Reactive Protein, High Sensitivity; Future    CBC and Auto Differential; Future    Comprehensive Metabolic Panel; Future    Lipid Panel; Future    Thyroid Stimulating Hormone; Future

## 2025-05-28 ENCOUNTER — APPOINTMENT (OUTPATIENT)
Dept: PRIMARY CARE | Facility: CLINIC | Age: 65
End: 2025-05-28
Payer: MEDICARE

## 2025-05-28 VITALS
WEIGHT: 157.8 LBS | SYSTOLIC BLOOD PRESSURE: 124 MMHG | BODY MASS INDEX: 27.96 KG/M2 | HEIGHT: 63 IN | TEMPERATURE: 97.4 F | HEART RATE: 58 BPM | DIASTOLIC BLOOD PRESSURE: 88 MMHG

## 2025-05-28 DIAGNOSIS — Z00.00 ROUTINE GENERAL MEDICAL EXAMINATION AT A HEALTH CARE FACILITY: Primary | ICD-10-CM

## 2025-05-28 DIAGNOSIS — I10 PRIMARY HYPERTENSION: ICD-10-CM

## 2025-05-28 DIAGNOSIS — R73.03 PREDIABETES: ICD-10-CM

## 2025-05-28 DIAGNOSIS — E55.9 VITAMIN D DEFICIENCY: ICD-10-CM

## 2025-05-28 DIAGNOSIS — E78.00 HYPERCHOLESTEROLEMIA: ICD-10-CM

## 2025-05-28 DIAGNOSIS — E03.9 HYPOTHYROIDISM, UNSPECIFIED TYPE: ICD-10-CM

## 2025-05-28 PROCEDURE — 3008F BODY MASS INDEX DOCD: CPT | Performed by: INTERNAL MEDICINE

## 2025-05-28 PROCEDURE — 3079F DIAST BP 80-89 MM HG: CPT | Performed by: INTERNAL MEDICINE

## 2025-05-28 PROCEDURE — 1160F RVW MEDS BY RX/DR IN RCRD: CPT | Performed by: INTERNAL MEDICINE

## 2025-05-28 PROCEDURE — 3074F SYST BP LT 130 MM HG: CPT | Performed by: INTERNAL MEDICINE

## 2025-05-28 PROCEDURE — 93000 ELECTROCARDIOGRAM COMPLETE: CPT | Performed by: INTERNAL MEDICINE

## 2025-05-28 PROCEDURE — G0439 PPPS, SUBSEQ VISIT: HCPCS | Performed by: INTERNAL MEDICINE

## 2025-05-28 PROCEDURE — 99213 OFFICE O/P EST LOW 20 MIN: CPT | Performed by: INTERNAL MEDICINE

## 2025-05-28 PROCEDURE — 1159F MED LIST DOCD IN RCRD: CPT | Performed by: INTERNAL MEDICINE

## 2025-05-28 PROCEDURE — 1170F FXNL STATUS ASSESSED: CPT | Performed by: INTERNAL MEDICINE

## 2025-05-28 ASSESSMENT — ENCOUNTER SYMPTOMS
DEPRESSION: 0
LOSS OF SENSATION IN FEET: 0
OCCASIONAL FEELINGS OF UNSTEADINESS: 0

## 2025-05-28 ASSESSMENT — ACTIVITIES OF DAILY LIVING (ADL)
MANAGING_FINANCES: INDEPENDENT
TAKING_MEDICATION: INDEPENDENT
GROCERY_SHOPPING: INDEPENDENT
BATHING: INDEPENDENT
DOING_HOUSEWORK: INDEPENDENT
DRESSING: INDEPENDENT

## 2025-05-28 ASSESSMENT — PATIENT HEALTH QUESTIONNAIRE - PHQ9
1. LITTLE INTEREST OR PLEASURE IN DOING THINGS: NOT AT ALL
2. FEELING DOWN, DEPRESSED OR HOPELESS: NOT AT ALL
SUM OF ALL RESPONSES TO PHQ9 QUESTIONS 1 AND 2: 0

## 2025-05-29 LAB
25(OH)D3+25(OH)D2 SERPL-MCNC: 41 NG/ML (ref 30–100)
ALBUMIN SERPL-MCNC: 4.5 G/DL (ref 3.6–5.1)
ALP SERPL-CCNC: 59 U/L (ref 37–153)
ALT SERPL-CCNC: 12 U/L (ref 6–29)
ANION GAP SERPL CALCULATED.4IONS-SCNC: 7 MMOL/L (CALC) (ref 7–17)
AST SERPL-CCNC: 15 U/L (ref 10–35)
BASOPHILS # BLD AUTO: 60 CELLS/UL (ref 0–200)
BASOPHILS NFR BLD AUTO: 1.4 %
BILIRUB SERPL-MCNC: 0.5 MG/DL (ref 0.2–1.2)
BUN SERPL-MCNC: 26 MG/DL (ref 7–25)
CALCIUM SERPL-MCNC: 9.4 MG/DL (ref 8.6–10.4)
CHLORIDE SERPL-SCNC: 102 MMOL/L (ref 98–110)
CHOLEST SERPL-MCNC: 221 MG/DL
CHOLEST/HDLC SERPL: 4.7 (CALC)
CO2 SERPL-SCNC: 31 MMOL/L (ref 20–32)
CREAT SERPL-MCNC: 0.63 MG/DL (ref 0.5–1.05)
CRP SERPL HS-MCNC: 0.9 MG/L
EGFRCR SERPLBLD CKD-EPI 2021: 97 ML/MIN/1.73M2
EOSINOPHIL # BLD AUTO: 69 CELLS/UL (ref 15–500)
EOSINOPHIL NFR BLD AUTO: 1.6 %
ERYTHROCYTE [DISTWIDTH] IN BLOOD BY AUTOMATED COUNT: 12.7 % (ref 11–15)
EST. AVERAGE GLUCOSE BLD GHB EST-MCNC: 137 MG/DL
EST. AVERAGE GLUCOSE BLD GHB EST-SCNC: 7.6 MMOL/L
GLUCOSE SERPL-MCNC: 95 MG/DL (ref 65–99)
HBA1C MFR BLD: 6.4 %
HCT VFR BLD AUTO: 38.7 % (ref 35–45)
HDLC SERPL-MCNC: 47 MG/DL
HGB BLD-MCNC: 12.5 G/DL (ref 11.7–15.5)
LDLC SERPL CALC-MCNC: 148 MG/DL (CALC)
LYMPHOCYTES # BLD AUTO: 1595 CELLS/UL (ref 850–3900)
LYMPHOCYTES NFR BLD AUTO: 37.1 %
MCH RBC QN AUTO: 30.6 PG (ref 27–33)
MCHC RBC AUTO-ENTMCNC: 32.3 G/DL (ref 32–36)
MCV RBC AUTO: 94.9 FL (ref 80–100)
MONOCYTES # BLD AUTO: 421 CELLS/UL (ref 200–950)
MONOCYTES NFR BLD AUTO: 9.8 %
NEUTROPHILS # BLD AUTO: 2154 CELLS/UL (ref 1500–7800)
NEUTROPHILS NFR BLD AUTO: 50.1 %
NONHDLC SERPL-MCNC: 174 MG/DL (CALC)
PLATELET # BLD AUTO: 299 THOUSAND/UL (ref 140–400)
PMV BLD REES-ECKER: 11.2 FL (ref 7.5–12.5)
POTASSIUM SERPL-SCNC: 3.8 MMOL/L (ref 3.5–5.3)
PROT SERPL-MCNC: 7 G/DL (ref 6.1–8.1)
RBC # BLD AUTO: 4.08 MILLION/UL (ref 3.8–5.1)
SODIUM SERPL-SCNC: 140 MMOL/L (ref 135–146)
TRIGL SERPL-MCNC: 136 MG/DL
TSH SERPL-ACNC: 0.82 MIU/L (ref 0.4–4.5)
VIT B12 SERPL-MCNC: 765 PG/ML (ref 200–1100)
WBC # BLD AUTO: 4.3 THOUSAND/UL (ref 3.8–10.8)

## 2025-06-26 ENCOUNTER — APPOINTMENT (OUTPATIENT)
Dept: ENDOCRINOLOGY | Facility: CLINIC | Age: 65
End: 2025-06-26
Payer: MEDICARE

## 2025-06-26 VITALS
HEIGHT: 63 IN | SYSTOLIC BLOOD PRESSURE: 110 MMHG | WEIGHT: 162.6 LBS | HEART RATE: 54 BPM | BODY MASS INDEX: 28.81 KG/M2 | DIASTOLIC BLOOD PRESSURE: 72 MMHG | RESPIRATION RATE: 16 BRPM

## 2025-06-26 DIAGNOSIS — E03.9 HYPOTHYROIDISM, UNSPECIFIED TYPE: Primary | ICD-10-CM

## 2025-06-26 DIAGNOSIS — R73.03 PRE-DIABETES: ICD-10-CM

## 2025-06-26 DIAGNOSIS — E03.9 HYPOTHYROIDISM, UNSPECIFIED: ICD-10-CM

## 2025-06-26 PROCEDURE — 1159F MED LIST DOCD IN RCRD: CPT | Performed by: INTERNAL MEDICINE

## 2025-06-26 PROCEDURE — 3008F BODY MASS INDEX DOCD: CPT | Performed by: INTERNAL MEDICINE

## 2025-06-26 PROCEDURE — 1036F TOBACCO NON-USER: CPT | Performed by: INTERNAL MEDICINE

## 2025-06-26 PROCEDURE — 1126F AMNT PAIN NOTED NONE PRSNT: CPT | Performed by: INTERNAL MEDICINE

## 2025-06-26 PROCEDURE — 1160F RVW MEDS BY RX/DR IN RCRD: CPT | Performed by: INTERNAL MEDICINE

## 2025-06-26 PROCEDURE — 99213 OFFICE O/P EST LOW 20 MIN: CPT | Performed by: INTERNAL MEDICINE

## 2025-06-26 PROCEDURE — 3074F SYST BP LT 130 MM HG: CPT | Performed by: INTERNAL MEDICINE

## 2025-06-26 PROCEDURE — 3078F DIAST BP <80 MM HG: CPT | Performed by: INTERNAL MEDICINE

## 2025-06-26 RX ORDER — BUTYROSPERMUM PARKII(SHEA BUTTER), SIMMONDSIA CHINENSIS (JOJOBA) SEED OIL, ALOE BARBADENSIS LEAF EXTRACT .01; 1; 3.5 G/100G; G/100G; G/100G
250 LIQUID TOPICAL 2 TIMES DAILY
COMMUNITY

## 2025-06-26 RX ORDER — LEVOTHYROXINE SODIUM 88 UG/1
88 TABLET ORAL DAILY
Qty: 90 TABLET | Refills: 3 | Status: SHIPPED | OUTPATIENT
Start: 2025-06-26

## 2025-06-26 ASSESSMENT — ENCOUNTER SYMPTOMS
DEPRESSION: 0
OCCASIONAL FEELINGS OF UNSTEADINESS: 0
FEVER: 0
FATIGUE: 0
DIARRHEA: 0
PALPITATIONS: 0
VOMITING: 0
NAUSEA: 0
HEADACHES: 0
SHORTNESS OF BREATH: 0
LOSS OF SENSATION IN FEET: 0
CHILLS: 0
COUGH: 0

## 2025-06-26 ASSESSMENT — PATIENT HEALTH QUESTIONNAIRE - PHQ9
SUM OF ALL RESPONSES TO PHQ9 QUESTIONS 1 AND 2: 0
1. LITTLE INTEREST OR PLEASURE IN DOING THINGS: NOT AT ALL
2. FEELING DOWN, DEPRESSED OR HOPELESS: NOT AT ALL

## 2025-06-26 ASSESSMENT — PAIN SCALES - GENERAL: PAINLEVEL_OUTOF10: 0-NO PAIN

## 2025-06-26 NOTE — PROGRESS NOTES
Endocrinology: Follow up visit  Subjective   Patient ID: Jailyn Savage is a 65 y.o. female who presents for Hypothyroidism and Prediabetes.    PCP: Naveen Roth MD    HPI  Here for yearly follow up hypothyroidism and predm  Since last visit doing well, travelling a great deal  Recent tsh normal but A1c is up to 6.4.  knows she could do better with eating habits  Feeling well    Review of Systems   Constitutional:  Negative for chills, fatigue and fever.   Respiratory:  Negative for cough and shortness of breath.    Cardiovascular:  Negative for chest pain and palpitations.   Gastrointestinal:  Negative for diarrhea, nausea and vomiting.   Neurological:  Negative for headaches.       Problem List[1]     Home Meds:  Current Outpatient Medications   Medication Instructions    b complex 0.4 mg tablet 1 tablet, Daily    cetirizine (ZYRTEC) 10 mg, oral, Daily    hydroCHLOROthiazide (Microzide) 12.5 mg tablet TAKE 2 TABLETS BY MOUTH EVERY DAY    ibuprofen 200 mg tablet     levothyroxine (SYNTHROID, LEVOXYL) 88 mcg, oral, Daily, as directed    multivitamin capsule 1 capsule, Daily RT        RX Allergies[2]     Objective   Vitals:    06/26/25 1338   BP: 110/72   Pulse: 54   Resp: 16      Vitals:    06/26/25 1338   Weight: 73.8 kg (162 lb 9.6 oz)      Body mass index is 28.8 kg/m².   Physical Exam  Constitutional:       Appearance: Normal appearance. She is overweight.   HENT:      Head: Normocephalic and atraumatic.   Neck:      Thyroid: No thyroid mass, thyromegaly or thyroid tenderness.   Cardiovascular:      Rate and Rhythm: Normal rate and regular rhythm.      Heart sounds: No murmur heard.     No gallop.   Pulmonary:      Effort: Pulmonary effort is normal.      Breath sounds: Normal breath sounds.   Abdominal:      Palpations: Abdomen is soft.      Comments: benign   Neurological:      General: No focal deficit present.      Mental Status: She is alert and oriented to person, place, and time.      Deep Tendon  "Reflexes: Reflexes are normal and symmetric.   Psychiatric:         Behavior: Behavior is cooperative.         Labs:  Lab Results   Component Value Date    HGBA1C 6.4 (H) 05/28/2025    TSH 0.82 05/28/2025    FREET4 1.60 (H) 05/24/2021      No results found for: \"PR1\", \"THYROIDPAB\", \"TSI\"     Assessment/Plan   Assessment & Plan  Hypothyroidism, unspecified type    Continue current t4 dosing  Pre-diabetes    Recheck A1c in the fall  Work on eating and activity  Follow up in one year  Hypothyroidism, unspecified    Orders:    levothyroxine (Synthroid, Levoxyl) 88 mcg tablet; Take 1 tablet (88 mcg) by mouth once daily. as directed        Electronically signed by:  Latricia Escobar MD 06/26/25 1:49 PM                   [1]   Patient Active Problem List  Diagnosis    Astigmatism of both eyes    Atypical hyperplasia of breast    Autoimmune thyroiditis    Bilateral shoulder pain    Chondromalacia of patella    Eczema intertrigo    Enthesopathy    Hyperopia of right eye with astigmatism and presbyopia    Hypertension    Hypothyroidism    Other specified hypothyroidism    Impaired fasting glucose    Hypercholesterolemia    Nontoxic multinodular goiter    PVD (posterior vitreous detachment), right eye    Vitamin D deficiency    Age-related nuclear cataract of both eyes   [2] No Known Allergies    "

## 2025-06-26 NOTE — PATIENT INSTRUCTIONS
Continue current thyroid med dose  Work on low carb, high protein diet  Recheck labs in the fall  Follow up in one year

## 2025-08-15 DIAGNOSIS — I10 HYPERTENSION, UNSPECIFIED TYPE: ICD-10-CM

## 2025-08-15 RX ORDER — HYDROCHLOROTHIAZIDE 12.5 MG/1
25 TABLET ORAL
Qty: 180 TABLET | Refills: 1 | Status: SHIPPED | OUTPATIENT
Start: 2025-08-15